# Patient Record
Sex: MALE | Race: BLACK OR AFRICAN AMERICAN | HISPANIC OR LATINO | Employment: FULL TIME | URBAN - METROPOLITAN AREA
[De-identification: names, ages, dates, MRNs, and addresses within clinical notes are randomized per-mention and may not be internally consistent; named-entity substitution may affect disease eponyms.]

---

## 2023-03-27 ENCOUNTER — TELEPHONE (OUTPATIENT)
Dept: FAMILY MEDICINE CLINIC | Facility: CLINIC | Age: 38
End: 2023-03-27

## 2023-03-27 NOTE — TELEPHONE ENCOUNTER
1st call - no answer - no message left for pt to call back to reschedule the 03/29 appt ( Provider )     2nd call - 03/23/23  no answer - no message left for pt to call back to reschedule the 03/29 appt ( Provider )